# Patient Record
(demographics unavailable — no encounter records)

---

## 2025-03-01 NOTE — HISTORY OF PRESENT ILLNESS
[de-identified] : Patient is a 15-year-old male accompanied by mother here for evaluation of left middle finger injury.  Patient states on 2/26/2025 he is playing basketball.  Patient states the basketball hit his middle finger and bent the finger back.  Patient was immediate pain.  Patient went to [facility] x-rays were taken and patient was told that he had an avulsion fracture of the finger.  Patient was placed in finger splint and told to follow with orthopedics.  Denies numbness or tingling.  Left middle finger exam: Mild swelling to proximal middle phalanx and PIP, mild tenderness palpation to PIP, good range of motion throughout the finger including the MCP, PIP, DIP with no gross instability good strength and neurovascularly intact good capillary refill  X-ray left middle finger from hospital: Acute mildly displaced avulsion fracture of the volar base of the third middle phalanx.  Discussed with patient and mother in detail the patient has avulsion fracture of the volar aspect of middle phalanx.  Discussed that these fractures generally heal well conservatively.  Inderjit tape patient's third and fourth digits for support, advised Motrin/Tylenol for pain warm water Epsom salt soaks.  Advised to refrain from high-impact activities/gym/sports until follow-up in 3 weeks with hand department.  Call if symptoms worsen or change.  Patient and mother understand agree with plan.

## 2025-03-20 NOTE — HISTORY OF PRESENT ILLNESS
[de-identified] : Patient is a 15-year-old male accompanied by mother here for evaluation of left middle finger injury.  Patient states on 2/26/2025 he is playing basketball.  Patient states the basketball hit his middle finger and bent the finger back.  He is here in follow-up.  He is improved but not 100% back to normal  Left middle finger exam: Mild swelling to proximal middle phalanx and PIP, mild tenderness palpation to PIP, good range of motion throughout the finger including the MCP, PIP, DIP with no gross instability good strength and neurovascularly intact good capillary refill  X-ray left middle finger from hospital: Acute mildly displaced avulsion fracture of the volar base of the third middle phalanx.  Discussed with patient and mother in detail the patient has avulsion fracture of the volar aspect of middle phalanx.  Discussed that these fractures generally heal well conservatively.  Inderjit tape patient's third and fourth digits for support, advised Motrin/Tylenol for pain warm water Epsom salt soaks.  Advised to refrain from high-impact activities/gym/sports until follow-up in 3 weeks with hand department.  Call if symptoms worsen or change.  Patient and mother understand agree with plan.